# Patient Record
Sex: MALE | Race: WHITE | HISPANIC OR LATINO | URBAN - METROPOLITAN AREA
[De-identification: names, ages, dates, MRNs, and addresses within clinical notes are randomized per-mention and may not be internally consistent; named-entity substitution may affect disease eponyms.]

---

## 2020-10-02 ENCOUNTER — TELEPHONE ENCOUNTER (OUTPATIENT)
Dept: URBAN - METROPOLITAN AREA CLINIC 113 | Facility: CLINIC | Age: 20
End: 2020-10-02

## 2020-10-05 ENCOUNTER — OFFICE VISIT (OUTPATIENT)
Dept: URBAN - METROPOLITAN AREA CLINIC 113 | Facility: CLINIC | Age: 20
End: 2020-10-05

## 2020-10-19 ENCOUNTER — LAB OUTSIDE AN ENCOUNTER (OUTPATIENT)
Dept: URBAN - METROPOLITAN AREA CLINIC 113 | Facility: CLINIC | Age: 20
End: 2020-10-19

## 2020-10-19 ENCOUNTER — OFFICE VISIT (OUTPATIENT)
Dept: URBAN - METROPOLITAN AREA CLINIC 113 | Facility: CLINIC | Age: 20
End: 2020-10-19
Payer: COMMERCIAL

## 2020-10-19 ENCOUNTER — WEB ENCOUNTER (OUTPATIENT)
Dept: URBAN - METROPOLITAN AREA CLINIC 113 | Facility: CLINIC | Age: 20
End: 2020-10-19

## 2020-10-19 ENCOUNTER — DASHBOARD ENCOUNTERS (OUTPATIENT)
Age: 20
End: 2020-10-19

## 2020-10-19 VITALS
TEMPERATURE: 98.6 F | HEART RATE: 58 BPM | BODY MASS INDEX: 17.9 KG/M2 | HEIGHT: 66 IN | RESPIRATION RATE: 18 BRPM | WEIGHT: 111.4 LBS | DIASTOLIC BLOOD PRESSURE: 74 MMHG | SYSTOLIC BLOOD PRESSURE: 118 MMHG

## 2020-10-19 DIAGNOSIS — R11.2 NAUSEA & VOMITING: ICD-10-CM

## 2020-10-19 DIAGNOSIS — R17 ELEVATED BILIRUBIN: ICD-10-CM

## 2020-10-19 PROBLEM — 18165001 JAUNDICE: Status: ACTIVE | Noted: 2020-10-19

## 2020-10-19 PROBLEM — 16932000 NAUSEA AND VOMITING: Status: ACTIVE | Noted: 2020-10-19

## 2020-10-19 PROCEDURE — 99204 OFFICE O/P NEW MOD 45 MIN: CPT | Performed by: PHYSICIAN ASSISTANT

## 2020-10-19 PROCEDURE — 1036F TOBACCO NON-USER: CPT | Performed by: PHYSICIAN ASSISTANT

## 2020-10-19 PROCEDURE — G8427 DOCREV CUR MEDS BY ELIG CLIN: HCPCS | Performed by: PHYSICIAN ASSISTANT

## 2020-10-19 NOTE — HPI-TODAY'S VISIT:
Mr. Howard is a 20 year old male presenting for evaluation of abdominal pain and vomiting. A few weeks ago, he woke up one morning with a bad stomach ache and vomiting.  He was seen in the ER that day, and was recommended to follow up.  He had an AXR and labs, along with IV fluids. He has not yet returned to [place].  He does not feel like eating, states it is a "struggle." He has been nauseated some morning upon awakening and with the smell of food.  He has not vomited again.  He has not had any further abdominal pain.  He has a bowel movement about every other day, no blood per rectum. When he was a child, he had an episode when he was very sick and hospitalized at [facility].  His mother reports he has always been picky  with his food.  His "liver was sick" during this hospitalization per his mother. He was treated for H. pylori several years ago by Dr. Dent.  He has not seen him in years.

## 2020-10-28 ENCOUNTER — OFFICE VISIT (OUTPATIENT)
Dept: URBAN - METROPOLITAN AREA SURGERY CENTER 25 | Facility: SURGERY CENTER | Age: 20
End: 2020-10-28
Payer: COMMERCIAL

## 2020-10-28 ENCOUNTER — CLAIMS CREATED FROM THE CLAIM WINDOW (OUTPATIENT)
Dept: URBAN - METROPOLITAN AREA CLINIC 4 | Facility: CLINIC | Age: 20
End: 2020-10-28
Payer: COMMERCIAL

## 2020-10-28 DIAGNOSIS — K22.8 COLUMNAR-LINED ESOPHAGUS: ICD-10-CM

## 2020-10-28 DIAGNOSIS — K31.89 ACQUIRED DEFORMITY OF DUODENUM: ICD-10-CM

## 2020-10-28 DIAGNOSIS — K29.60 OTHER GASTRITIS WITHOUT BLEEDING: ICD-10-CM

## 2020-10-28 PROCEDURE — 88305 TISSUE EXAM BY PATHOLOGIST: CPT | Performed by: PATHOLOGY

## 2020-10-28 PROCEDURE — 43239 EGD BIOPSY SINGLE/MULTIPLE: CPT | Performed by: INTERNAL MEDICINE

## 2020-10-28 PROCEDURE — 88312 SPECIAL STAINS GROUP 1: CPT | Performed by: PATHOLOGY

## 2020-10-29 ENCOUNTER — TELEPHONE ENCOUNTER (OUTPATIENT)
Dept: URBAN - METROPOLITAN AREA CLINIC 113 | Facility: CLINIC | Age: 20
End: 2020-10-29

## 2020-10-29 LAB
ALBUMIN: 4.6
ALKALINE PHOSPHATASE: 78
ALT (SGPT): 12
AST (SGOT): 13
BILIRUBIN, DIRECT: 0.35
BILIRUBIN, TOTAL: 1.8
PROTEIN, TOTAL: 7

## 2020-11-24 ENCOUNTER — OFFICE VISIT (OUTPATIENT)
Dept: URBAN - METROPOLITAN AREA CLINIC 113 | Facility: CLINIC | Age: 20
End: 2020-11-24

## 2020-11-24 NOTE — HPI-TODAY'S VISIT:
This is a 20-year-old gentleman presenting for follow-up after EGD. He was seen on 10/19/2020 for evaluation of chronic nausea with episodes of vomiting and abdominal discomfort.  EGD was scheduled.  A functional component was suspected.  Recent labs noted an elevation in total bilirubin, hepatic function panel was ordered. EGD was performed on 10/28/2020.  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 pylori. Labs on 10/27/2020 show total bilirubin 1.8, direct bilirubin 0.35, alkaline phosphatase 78, AST 13, ALT 12.